# Patient Record
Sex: FEMALE | HISPANIC OR LATINO | URBAN - METROPOLITAN AREA
[De-identification: names, ages, dates, MRNs, and addresses within clinical notes are randomized per-mention and may not be internally consistent; named-entity substitution may affect disease eponyms.]

---

## 2018-04-25 ENCOUNTER — APPOINTMENT (RX ONLY)
Dept: URBAN - METROPOLITAN AREA CLINIC 50 | Facility: CLINIC | Age: 38
Setting detail: DERMATOLOGY
End: 2018-04-25

## 2018-04-25 DIAGNOSIS — L71.8 OTHER ROSACEA: ICD-10-CM

## 2018-04-25 DIAGNOSIS — L21.8 OTHER SEBORRHEIC DERMATITIS: ICD-10-CM

## 2018-04-25 PROBLEM — L20.84 INTRINSIC (ALLERGIC) ECZEMA: Status: ACTIVE | Noted: 2018-04-25

## 2018-04-25 PROCEDURE — 99202 OFFICE O/P NEW SF 15 MIN: CPT

## 2018-04-25 PROCEDURE — ? COUNSELING

## 2018-04-25 PROCEDURE — ? PRESCRIPTION

## 2018-04-25 PROCEDURE — ? TREATMENT REGIMEN

## 2018-04-25 PROCEDURE — ? PATIENT SPECIFIC COUNSELING

## 2018-04-25 RX ORDER — METRONIDAZOLE 7.5 MG/G
GEL TOPICAL
Qty: 1 | Refills: 6 | Status: ERX | COMMUNITY
Start: 2018-04-25

## 2018-04-25 RX ORDER — OXICONAZOLE NITRATE 10 MG/G
CREAM TOPICAL
Qty: 1 | Refills: 6 | Status: ERX | COMMUNITY
Start: 2018-04-25

## 2018-04-25 RX ADMIN — METRONIDAZOLE: 7.5 GEL TOPICAL at 17:21

## 2018-04-25 RX ADMIN — OXICONAZOLE NITRATE: 10 CREAM TOPICAL at 17:23

## 2018-04-25 ASSESSMENT — LOCATION DETAILED DESCRIPTION DERM: LOCATION DETAILED: LEFT INFERIOR CENTRAL MALAR CHEEK

## 2018-04-25 ASSESSMENT — LOCATION SIMPLE DESCRIPTION DERM: LOCATION SIMPLE: LEFT CHEEK

## 2018-04-25 ASSESSMENT — LOCATION ZONE DERM: LOCATION ZONE: FACE

## 2018-04-25 NOTE — PROCEDURE: TREATMENT REGIMEN
Detail Level: Zone
Initiate Treatment: Wash face daily with Vanicream Zbar, apply metrogel qd, apply oxistat qd